# Patient Record
Sex: FEMALE | Race: WHITE | Employment: FULL TIME | URBAN - METROPOLITAN AREA
[De-identification: names, ages, dates, MRNs, and addresses within clinical notes are randomized per-mention and may not be internally consistent; named-entity substitution may affect disease eponyms.]

---

## 2017-12-09 ENCOUNTER — HOSPITAL ENCOUNTER (OUTPATIENT)
Dept: OTHER | Age: 32
Discharge: HOME | End: 2017-12-09
Attending: INTERNAL MEDICINE | Admitting: INTERNAL MEDICINE

## 2017-12-09 DIAGNOSIS — S93.402A SPRAIN OF LEFT ANKLE, UNSPECIFIED LIGAMENT, INITIAL ENCOUNTER: ICD-10-CM

## 2018-06-18 ENCOUNTER — HOSPITAL ENCOUNTER (OUTPATIENT)
Dept: PSYCHIATRY | Age: 33
Discharge: HOME | End: 2018-06-30
Attending: PSYCHIATRY & NEUROLOGY | Admitting: PSYCHIATRY & NEUROLOGY

## 2018-06-18 ASSESSMENT — PATIENT HEALTH QUESTIONNAIRE - PHQ9: SUM OF ALL RESPONSES TO PHQ QUESTIONS 1-9: 22

## 2018-06-18 ASSESSMENT — SLEEP AND FATIGUE QUESTIONNAIRES
DO YOU HAVE DIFFICULTY SLEEPING: YES
DIFFICULTY FALLING ASLEEP: NO
AVERAGE NUMBER OF SLEEP HOURS: 10
RESTFUL SLEEP: NO
SLEEP PATTERN: DIFFICULTY ARISING
DIFFICULTY ARISING: YES
DO YOU USE A SLEEP AID: NO
DIFFICULTY STAYING ASLEEP: NO

## 2018-06-18 ASSESSMENT — LIFESTYLE VARIABLES: HISTORY_ALCOHOL_USE: NO
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
## 2018-06-19 ENCOUNTER — HOSPITAL ENCOUNTER (OUTPATIENT)
Dept: PSYCHIATRY | Age: 33
Discharge: HOME | End: 2018-06-20
Admitting: PSYCHIATRY & NEUROLOGY

## 2018-06-21 ENCOUNTER — HOSPITAL ENCOUNTER (OUTPATIENT)
Dept: PSYCHIATRY | Age: 33
Discharge: HOME | End: 2018-06-22
Admitting: PSYCHIATRY & NEUROLOGY

## 2018-06-21 DIAGNOSIS — F33.1 MODERATE EPISODE OF RECURRENT MAJOR DEPRESSIVE DISORDER (HCC): ICD-10-CM

## 2018-06-21 PROCEDURE — 90791 PSYCH DIAGNOSTIC EVALUATION: CPT | Performed by: PSYCHIATRY & NEUROLOGY

## 2018-06-22 ENCOUNTER — HOSPITAL ENCOUNTER (OUTPATIENT)
Dept: PSYCHIATRY | Age: 33
Discharge: HOME | End: 2018-06-23
Admitting: PSYCHIATRY & NEUROLOGY

## 2018-06-26 ENCOUNTER — HOSPITAL ENCOUNTER (OUTPATIENT)
Dept: PSYCHIATRY | Age: 33
Discharge: HOME | End: 2018-06-27
Admitting: PSYCHIATRY & NEUROLOGY

## 2018-06-28 ENCOUNTER — HOSPITAL ENCOUNTER (OUTPATIENT)
Dept: PSYCHIATRY | Age: 33
Discharge: HOME | End: 2018-06-29
Admitting: PSYCHIATRY & NEUROLOGY

## 2018-06-29 ENCOUNTER — HOSPITAL ENCOUNTER (OUTPATIENT)
Dept: PSYCHIATRY | Age: 33
Discharge: HOME | End: 2018-06-30
Admitting: PSYCHIATRY & NEUROLOGY

## 2018-07-01 ENCOUNTER — HOSPITAL ENCOUNTER (OUTPATIENT)
Dept: OTHER | Age: 33
Discharge: HOME | End: 2018-07-01
Attending: PSYCHIATRY & NEUROLOGY | Admitting: PSYCHIATRY & NEUROLOGY

## 2018-07-03 ENCOUNTER — HOSPITAL ENCOUNTER (OUTPATIENT)
Dept: PSYCHIATRY | Age: 33
Discharge: HOME | End: 2018-07-04
Admitting: PSYCHIATRY & NEUROLOGY

## 2018-07-03 NOTE — PLAN OF CARE
Problem: Altered Mood, Depressive Behavior:  Goal: Able to verbalize acceptance of life and situations over which he or she has no control  Able to verbalize acceptance of life and situations over which he or she has no control                                                                       Group Therapy Note    Date: 7/3/2018  Start Time: 8:30  End Time:  9:45  Number of Participants: 9    Type of Group: Psychotherapy    Patient's Goal:  Pt will improve interpersonal interactions through group processing and agenda settting. Notes:  Pt participated in group discussion, provided supportive feedback toward others, and addressed her agenda within the group. Status After Intervention:  Unchanged    Participation Level:  Active Listener and Interactive    Participation Quality: Appropriate, Attentive and Sharing      Speech:  normal      Thought Process/Content: Logical      Affective Functioning: Incongruent      Mood: euthymic      Level of consciousness:  Alert and Attentive      Response to Learning: Able to verbalize current knowledge/experience and Progressing to goal      Endings: None Reported    Modes of Intervention: Education, Support, Socialization, Exploration and Clarifying      Discipline Responsible: /Counselor      Signature:  Haley Mccann

## 2018-07-03 NOTE — PLAN OF CARE
Problem: Altered Mood, Depressive Behavior:  Goal: Able to verbalize and/or display a decrease in depressive symptoms  Able to verbalize and/or display a decrease in depressive symptoms   Outcome: Ongoing                                                                      Group Therapy Note    Date: 7/3/2018  Start Time: 11:15am  End Time:  12:05pm  Number of Participants: 9    Type of Group: Relapse Prevention    Patient's Goal:  To identify negative thinking patterns and early warning signs that may lead to or trigger relapse. Notes:  Pt participated in group discussion and exploration. Pt was open to support and feedback from group members. Pt agreed with other group members that she experiences passive SI at times when she is overwhelmed. Pt stated that she is now more active in relapse prevention planning to provide herself with distractions and supports to assist her in managing her mood and providing care should her thoughts begin to move toward intent to harm herself. Status After Intervention:  Improved    Participation Level:  Active Listener and Interactive    Participation Quality: Appropriate, Attentive, Sharing and Supportive      Speech:  normal      Thought Process/Content: Logical      Affective Functioning: Congruent      Mood: depressed      Level of consciousness:  Alert, Oriented x4 and Attentive      Response to Learning: Able to verbalize/acknowledge new learning, Able to retain information and Capable of insight      Endings: None Reported    Modes of Intervention: Education, Support, Socialization and Exploration      Discipline Responsible: /Counselor      Signature:  RAKAN Singh

## 2018-07-05 ENCOUNTER — HOSPITAL ENCOUNTER (OUTPATIENT)
Dept: PSYCHIATRY | Age: 33
Discharge: HOME | End: 2018-07-06
Admitting: PSYCHIATRY & NEUROLOGY

## 2018-07-05 NOTE — PLAN OF CARE
Problem: Altered Mood, Depressive Behavior:  Goal: Able to verbalize and/or display a decrease in depressive symptoms  Able to verbalize and/or display a decrease in depressive symptoms   Outcome: Ongoing                                                                      Group Therapy Note    Date: 7/5/2018  Start Time: 10:00am  End Time: 11:00am  Number of Participants: 6    Type of Group: Psychoeducation    Psychoeducation/ Recreation Therapy     Patient's Goal: To discuss stress and positive coping skills. Notes: Pt engaged in group discussion. With fellow group members, pt collaboratively came up with healthy activities to engage in as positive coping skills.      Status After Intervention:  Improved    Participation Level: Minimal    Participation Quality: Appropriate and Attentive      Speech:  normal      Thought Process/Content: Logical      Affective Functioning: Congruent      Mood: depressed      Level of consciousness:  Alert and Oriented x4      Response to Learning: Able to retain information and Capable of insight      Endings: None Reported    Modes of Intervention: Education, Support, Socialization and Activity      Discipline Responsible: Psychoeducational Specialist      Signature:  Jason Sethi patient

## 2018-07-06 ENCOUNTER — HOSPITAL ENCOUNTER (OUTPATIENT)
Dept: PSYCHIATRY | Age: 33
Discharge: HOME | End: 2018-07-07
Admitting: PSYCHIATRY & NEUROLOGY

## 2018-07-06 NOTE — PLAN OF CARE
Problem: Altered Mood, Depressive Behavior:  Goal: Able to verbalize and/or display a decrease in depressive symptoms  Able to verbalize and/or display a decrease in depressive symptoms   Outcome: Ongoing                                                                      Group Therapy Note    Date: 7/6/2018  Start Time: 11:15am  End Time:  12:05pm  Number of Participants: 7    Type of Group: Relapse Prevention    Patient's Goal:  To identify triggers for relapse and to develop cognitive restructuring skills to address stressors and triggers. Notes:  Pt participated in group discussion and exploration. Pt was open to feedback and provided support for other group members. Pt made progress toward stated goal.    Status After Intervention:  Improved    Participation Level:  Active Listener and Interactive    Participation Quality: Appropriate, Attentive, Sharing and Supportive      Speech:  normal      Thought Process/Content: Logical      Affective Functioning: Congruent      Mood: depressed      Level of consciousness:  Alert, Oriented x4 and Attentive      Response to Learning: Able to verbalize/acknowledge new learning, Able to retain information and Capable of insight      Endings: None Reported    Modes of Intervention: Education, Support, Socialization and Exploration      Discipline Responsible: /Counselor      Signature:  RAKAN Landa

## 2018-07-10 ENCOUNTER — HOSPITAL ENCOUNTER (OUTPATIENT)
Dept: PSYCHIATRY | Age: 33
Discharge: HOME | End: 2018-07-11
Admitting: PSYCHIATRY & NEUROLOGY

## 2018-07-10 NOTE — PLAN OF CARE
Problem: Altered Mood, Depressive Behavior:  Goal: Able to verbalize acceptance of life and situations over which he or she has no control  Able to verbalize acceptance of life and situations over which he or she has no control   Outcome: Ongoing                                                                      Group Therapy Note  Date: 7/10/2018  Start Time: 8:30 am   End Time:  9:45 am   Number of Participants: 7    Type of Group: Psychotherapy    Patient's Goal:  Pt's goal was to share how she feels and share her opinion. Notes:  Pt was engaged in group. Pt met her goal.     Status After Intervention:  Improved    Participation Level:  Active Listener and Interactive    Participation Quality: Appropriate, Attentive and Sharing      Speech:  normal      Thought Process/Content: Logical  Linear      Affective Functioning: Congruent      Mood: euthymic      Level of consciousness:  Alert, Oriented x4 and Attentive      Response to Learning: Able to verbalize current knowledge/experience, Able to verbalize/acknowledge new learning, Able to retain information, Capable of insight, Able to change behavior and Progressing to goal      Endings: None Reported    Modes of Intervention: Education, Support, Socialization, Exploration, Clarifying, Problem-solving, Activity and Movement      Discipline Responsible: /Counselor      Signature:  Jailene De La Fuente, [facility]

## 2018-07-10 NOTE — BH NOTE
Therapist me with Pt to discuss her aftercare. Pt said she is going to return to her therapist, Reyna Katz and her psychiatrist Dr. Carolin Zepeda.

## 2018-07-10 NOTE — PLAN OF CARE
Problem: Altered Mood, Depressive Behavior:  Goal: Able to verbalize acceptance of life and situations over which he or she has no control  Able to verbalize acceptance of life and situations over which he or she has no control   Outcome: Ongoing                                                                      Group Therapy Note    Date: 7/10/2018  Start Time: 10:00am  End Time:  11:00am  Number of Participants: 8     Type of Group: Cognitive Skills     Patient's Goal:  Pts were directed to engage in a group directive focusing on dealing with shame, vulnerability and self-expression. Pts were encouraged to complete the sentences \"I would not like to be perceived as. Delio Pottier Delio Pottier \", \"I would like to be perceived as. Delio Pottier Delio Pottier \", \"I feel like a failure when. Delio Pottier Delio Pottier \" and \"1 of my strengths includes. Delio Pottier Delio Pottier \" Pts were then encouraged to engage in group discussion regarding these topics.      Notes:  Marcy displayed positive ability to engage in directive. She was able to identify answers to each of the questions and displayed positive effort to be an active member of the group. Status After Intervention:  Improved    Participation Level:  Active Listener and Interactive    Participation Quality: Appropriate, Attentive, Sharing and Supportive      Speech:  normal      Thought Process/Content: Logical  Linear      Affective Functioning: Congruent      Mood: euthymic      Level of consciousness:  Alert, Oriented x4 and Attentive      Response to Learning: Able to verbalize current knowledge/experience, Able to verbalize/acknowledge new learning, Able to retain information and Progressing to goal      Endings: None Reported    Modes of Intervention: Education, Support, Exploration, Clarifying, Problem-solving, Activity and Limit-setting      Discipline Responsible: Psychoeducational Specialist      Signature:  Jayashree Duganr, MS, ATR-BC

## 2018-07-12 ENCOUNTER — HOSPITAL ENCOUNTER (OUTPATIENT)
Dept: PSYCHIATRY | Age: 33
Discharge: HOME | End: 2018-07-13
Admitting: PSYCHIATRY & NEUROLOGY

## 2018-07-12 NOTE — PLAN OF CARE
Problem: Altered Mood, Depressive Behavior:  Goal: Able to verbalize acceptance of life and situations over which he or she has no control  Able to verbalize acceptance of life and situations over which he or she has no control   Outcome: Ongoing                                                                      Group Therapy Note    Date: 7/12/2018  Start Time: 10:00am  End Time:  11:00am  Number of Participants: 6    Type of Group: Cognitive Skills/Art Therapy     Patient's Goal:  Pts were directed to identify 5 values that are of importance to them. Pts were then directed to create small drawings of of how they honor their values. Pts were encouraged to share their values with the group. Notes:  Marcy displayed positive ability to engage in the directive and share with the group. She identified her values to include, family, the healthy development of her kids, etc.     Status After Intervention:  Improved    Participation Level:  Active Listener and Interactive    Participation Quality: Appropriate, Attentive, Sharing and Supportive      Speech:  normal      Thought Process/Content: Logical  Linear      Affective Functioning: artificially bright      Mood: euthymic      Level of consciousness:  Alert, Oriented x4 and Attentive      Response to Learning: Able to verbalize current knowledge/experience, Able to verbalize/acknowledge new learning, Able to retain information and Progressing to goal      Endings: None Reported    Modes of Intervention: Education, Support, Exploration, Clarifying, Problem-solving, Activity and Limit-setting      Discipline Responsible: Psychoeducational Specialist      Signature:  Laura Singletary MS, ATR-BC

## 2018-07-13 ENCOUNTER — HOSPITAL ENCOUNTER (OUTPATIENT)
Dept: PSYCHIATRY | Age: 33
Discharge: HOME | End: 2018-07-14
Admitting: PSYCHIATRY & NEUROLOGY

## 2018-07-13 NOTE — PLAN OF CARE
Problem: Altered Mood, Depressive Behavior:  Goal: Able to verbalize and/or display a decrease in depressive symptoms  Able to verbalize and/or display a decrease in depressive symptoms   Outcome: Ongoing                                                                      Group Therapy Note    Date: 2018  Start Time: 10:00am  End Time:  11:00am  Number of Participants: 8    Type of Group: Relapse Prevention    Patient's Goal:  To identify upcoming events or situations that may trigger a relapse of MH symptoms and/or substance abuse. Notes:  Pt participated in group discussion and exploration. Pt was open to group feedback and offered feedback and support to others. 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 Pt expressed that these events bring her torrie and reduce her depressive feelings. Pt stated that she is working to plan her downtime to have more activities like this. Status After Intervention:  Improved    Participation Level:  Active Listener and Interactive    Participation Quality: Appropriate, Attentive, Sharing and Supportive      Speech:  normal      Thought Process/Content: Logical      Affective Functioning: Congruent      Mood: depressed      Level of consciousness:  Alert, Oriented x4 and Attentive      Response to Learning: Able to verbalize/acknowledge new learning, Able to retain information and Capable of insight      Endings: None Reported    Modes of Intervention: Education, Support, Socialization and Exploration      Discipline Responsible: /Counselor      Signature:  RAKAN Vann

## 2018-07-17 ENCOUNTER — HOSPITAL ENCOUNTER (OUTPATIENT)
Dept: PSYCHIATRY | Age: 33
Setting detail: THERAPIES SERIES
Discharge: HOME | End: 2018-07-17
Payer: COMMERCIAL

## 2018-07-17 PROCEDURE — 90853 GROUP PSYCHOTHERAPY: CPT | Performed by: COUNSELOR

## 2018-07-17 PROCEDURE — 90853 GROUP PSYCHOTHERAPY: CPT

## 2018-07-17 NOTE — PLAN OF CARE
Problem: Altered Mood, Depressive Behavior:  Goal: Able to verbalize acceptance of life and situations over which he or she has no control  Able to verbalize acceptance of life and situations over which he or she has no control   Outcome: Met This Shift                                                                      Group Therapy Note    Date: 7/17/2018  Start Time: 8:30 am   End Time:  9:45 am   Number of Participants: 9    Type of Group: Psychotherapy    Patient's Goal:  Pt's goal was to process her last day. Notes:  Pt was engaged in group. Pt shared that her  decided that he no longer loves her and does not want to be . Pt was tearful. Pt shared that she has been expressing her self to her . Pt met her goal.      Status After Intervention:  Improved    Participation Level:  Active Listener and Interactive    Participation Quality: Appropriate, Attentive, Sharing and Supportive      Speech:  normal      Thought Process/Content: Logical  Linear      Affective Functioning: Congruent      Mood: anxious and depressed      Level of consciousness:  Alert, Oriented x4 and Attentive      Response to Learning: Able to verbalize current knowledge/experience, Able to verbalize/acknowledge new learning, Able to retain information, Capable of insight, Able to change behavior and Progressing to goal      Endings: None Reported    Modes of Intervention: Education, Support, Socialization, Exploration, Clarifying, Problem-solving, Activity and Movement      Discipline Responsible: /Counselor      Signature:  Jailene De La Fuente, [facility]

## 2018-07-17 NOTE — PLAN OF CARE
Problem: Altered Mood, Depressive Behavior:  Goal: Able to verbalize and/or display a decrease in depressive symptoms  Able to verbalize and/or display a decrease in depressive symptoms   Outcome: Ongoing                                                                      Group Therapy Note    Date: 7/17/2018  Start Time: 10:00am  End Time:  11:00am  Number of Participants: 10     Type of Group: Cognitive Skills     Patient's Goal:  Pts were directed to engage in a drawing directive focusing on the G.L.A.D. Technique: identifying something you are grateful for, something you learned within the last week, something you accomplished within the last week and something that brings you torrie in life. Pts were encouraged to share their drawing with fellow group members.     Notes:  Rowan Hylton displayed positive ability to complete the directive and share her completed drawing with the group. Status After Intervention:  Improved    Participation Level:  Active Listener and Interactive    Participation Quality: Appropriate, Attentive, Sharing and Supportive      Speech:  normal      Thought Process/Content: Logical  Linear      Affective Functioning: Congruent      Mood: euthymic      Level of consciousness:  Alert, Oriented x4 and Attentive      Response to Learning: Able to verbalize current knowledge/experience, Able to verbalize/acknowledge new learning, Able to retain information, Capable of insight and Progressing to goal      Endings: None Reported    Modes of Intervention: Education, Support, Exploration, Clarifying, Problem-solving, Activity and Limit-setting      Discipline Responsible: Psychoeducational Specialist      Signature:  Kaylan Torres MS, ATR-BC

## 2018-07-18 ENCOUNTER — HOSPITAL ENCOUNTER (OUTPATIENT)
Dept: PSYCHIATRY | Age: 33
Setting detail: THERAPIES SERIES
Discharge: HOME | End: 2018-07-18
Payer: COMMERCIAL

## 2018-07-18 DIAGNOSIS — F41.1 GAD (GENERALIZED ANXIETY DISORDER): ICD-10-CM

## 2018-07-18 DIAGNOSIS — F43.10 PTSD (POST-TRAUMATIC STRESS DISORDER): ICD-10-CM

## 2018-07-18 PROCEDURE — 99217 PR OBSERVATION CARE DISCHARGE MANAGEMENT: CPT | Performed by: PSYCHIATRY & NEUROLOGY

## 2018-07-18 PROCEDURE — 90853 GROUP PSYCHOTHERAPY: CPT | Performed by: COUNSELOR

## 2018-07-18 PROCEDURE — 90853 GROUP PSYCHOTHERAPY: CPT

## 2018-07-18 NOTE — PROGRESS NOTES
Discharge Summary   Admit Date: 6/20/2018  Discharge Date:    07/20/2018  Spent over 30 minutes with patient and staff on [facility]   Final Dx: axis I:  Major depressive episode, recurrent, nonpsychotic, severe. 2.  Generalized anxiety disorder. 3.  PTSD. Axis 2: No diagnosis  [place] 3: See Medical History    Axis 4: related to the social environment  Axis 5:  On Admission: 41-50 serious symptoms At Discharge: 61-70 mild symptoms   All conditions on Axis 1 and Axis 2 and active problems on Axis 3 were treated while patient was hospitalized. (There are no active hospital problems to display for this patient.  )   Condition on DC  Mood and affect are stable and pt is not suicidal   VITALS:  There were no vitals taken for this visit. Brief Summary Present Illness   The patient is a 70-year-old white female who has been  diagnosed with major depression and generalized anxiety. She states she  has been feeling depressed since childhood, but has been worsening over the  past 6-7 years. She has been treating with Dr. Augie Escalera since  01/2018 and has been seeing an individual therapist and marriage counselor  through [facility] and [facility]. She stated that her mood has  been labile and that she has been sleeping okay and eating relatively well. She complains of nightmares, although she is not waking up with a  nightmares, but her  complains that she is fighting in her dreams at  night. She stated that her anxiety and mood have been disrupted and she  will go into rages at times. She states that she will become physical and  push her  away and she is trying to find a way to cope. She does  not want her three children to see her behaving in this manner.   was involved in the intake process and apparently would answer questions  for patient. They have been together since she was 12years of age. She  is on leave from work currently. They are marriage counseling as well. Apparently, the patient and her  has been in an open marriage since  2011. Apparently, the patient said that the  dates other people,  but she does not because she does not believe in it. The patient's   just came out that he is polyamorous. The patient attempted suicide at the  beginning of 03/2018 after  broke up a relationship with a woman. Apparently, the patient worked with someone who was causing problems in the  marriage as well. She denies active suicidal ideation  Hospital Course  Patient stabilized on meds and milieu treatment. Patient was discharged to home to continue recovery in the community. PE: (reviewed) and labs (see medical H&PE)  Labs:    No visits with results within 2 Day(s) from this visit.    Latest known visit with results is:   Admission on 04/15/2018, Discharged on 04/16/2018   Component Date Value Ref Range Status    WBC 04/15/2018 12.7* 4.0 - 11.0 K/uL Final    RBC 04/15/2018 4.34  4.00 - 5.20 M/uL Final    Hemoglobin 04/15/2018 13.9  12.0 - 16.0 g/dL Final    Hematocrit 04/15/2018 40.1  36.0 - 48.0 % Final    MCV 04/15/2018 92.5  80.0 - 100.0 fL Final    MCH 04/15/2018 32.0  26.0 - 34.0 pg Final    MCHC 04/15/2018 34.5  31.0 - 36.0 g/dL Final    RDW 04/15/2018 12.4  12.4 - 15.4 % Final    Platelets 68/81/5241 264  135 - 450 K/uL Final    MPV 04/15/2018 7.8  5.0 - 10.5 fL Final    Neutrophils % 04/15/2018 64.8  % Final    Lymphocytes % 04/15/2018 19.4  % Final    Monocytes % 04/15/2018 7.0  % Final    Eosinophils % 04/15/2018 8.4  % Final    Basophils % 04/15/2018 0.4  % Final    Neutrophils # 04/15/2018 8.2* 1.7 - 7.7 K/uL Final    Lymphocytes # 04/15/2018 2.5  1.0 - 5.1 K/uL Final    Monocytes # 04/15/2018 0.9  0.0 - 1.3 K/uL Final    Eosinophils # 04/15/2018 1.1* 0.0 - 0.6 K/uL Final    Basophils # 04/15/2018 0.0  0.0 - 0.2 K/uL Final    Sodium 04/15/2018 140  136 - 145 mmol/L Final    Potassium 04/15/2018 3.8  3.5 - 5.1 mmol/L Final    Chloride 04/15/2018 105  99 - 110 mmol/L Final    CO2 04/15/2018 22  21 - 32 mmol/L Final    Anion Gap 04/15/2018 13  3 - 16 Final    Glucose 04/15/2018 98  70 - 99 mg/dL Final    BUN 04/15/2018 15  7 - 20 mg/dL Final    CREATININE 04/15/2018 <0.5* 0.6 - 1.1 mg/dL Final    GFR Non- 04/15/2018 >60  >60 Final    Comment: >60 mL/min/1.73m2 EGFR, calc. for ages 25 and older using the  MDRD formula (not corrected for weight), is valid for stable  renal function.  GFR  04/15/2018 >60  >60 Final    Comment: Chronic Kidney Disease: less than 60 ml/min/1.73 sq.m. Kidney Failure: less than 15 ml/min/1.73 sq.m. Results valid for patients 18 years and older.  Calcium 04/15/2018 9.8  8.3 - 10.6 mg/dL Final    Total Protein 04/15/2018 6.9  6.4 - 8.2 g/dL Final    Alb 04/15/2018 4.1  3.4 - 5.0 g/dL Final    Albumin/Globulin Ratio 04/15/2018 1.5  1.1 - 2.2 Final    Total Bilirubin 04/15/2018 <0.2  0.0 - 1.0 mg/dL Final    Alkaline Phosphatase 04/15/2018 71  40 - 129 U/L Final    ALT 04/15/2018 12  10 - 40 U/L Final    AST 04/15/2018 12* 15 - 37 U/L Final    Globulin 04/15/2018 2.8  g/dL Final    Ethanol Lvl 04/15/2018 None Detected  mg/dL Final    Comment:    None Detected  Conversion factor:  100 mg/dl = .100 g/dl  For Medical Purposes Only      HCG(Urine) Pregnancy Test 04/15/2018 Negative  Detects HCG level >20 MIU/mL Final    Comment: Note:  False Negative pregnancy results have been reported in  early pregnancy due to insufficient amounts of hCG and in late  first trimester pregnancies, though very rare, due to the hook  effect.   Always repeat results in question with a serum  quantitative pregnancy test. A serum hCG is positive 2-5 days  before the urine pregnancy test.      Color, UA 04/15/2018 Yellow  Straw/Yellow Final    Clarity, UA 04/15/2018 Clear  Clear Final    Glucose, Ur 04/15/2018 Negative  Negative mg/dL Final    Bilirubin Urine 04/15/2018 Negative  Negative Final    Ketones, Urine 04/15/2018 Negative  Negative mg/dL Final    Specific Gravity, UA 04/15/2018 >=1.030  1.005 - 1.030 Final    Blood, Urine 04/15/2018 Negative  Negative Final    pH, UA 04/15/2018 5.5  5.0 - 8.0 Final    Protein, UA 04/15/2018 Negative  Negative mg/dL Final    Urobilinogen, Urine 04/15/2018 0.2  <2.0 E.U./dL Final    Nitrite, Urine 04/15/2018 Negative  Negative Final    Leukocyte Esterase, Urine 04/15/2018 Negative  Negative Final    Microscopic Examination 04/15/2018 Not Indicated   Final    Urine Type 04/15/2018 Not Specified   Final    Amphetamine Screen, Urine 04/15/2018 Neg  Negative <1000ng/mL Final    Barbiturate Screen, Ur 04/15/2018 Neg  Negative <200 ng/mL Final    Benzodiazepine Screen, Urine 04/15/2018 Neg  Negative <200 ng/mL Final    Cannabinoid Scrn, Ur 04/15/2018 Neg  Negative <50 ng/mL Final    Cocaine Metabolite Screen, Urine 04/15/2018 Neg  Negative <300 ng/mL Final    Opiate Scrn, Ur 04/15/2018 Neg  Negative <300 ng/mL Final    Comment: \"Therapeutic levels of pain medication, especially oxycontin and synthetic  opioids, may not be detected by this Methodology. Pain management screen  panel  Drug panel-PM-Hi Res Ur, Interp (PAIN) should be considered for drug  monitoring \".  PCP Screen, Urine 04/15/2018 Neg  Negative <25 ng/mL Final    Methadone Screen, Urine 04/15/2018 Neg  Negative <300 ng/mL Final    Propoxyphene Scrn, Ur 04/15/2018 Neg  Negative <300 ng/mL Final    pH, UA 04/15/2018 5.5   Final    Comment: Urine pH less than 5.0 or greater than 8.0 may indicate sample adulteration. Another sample should be collected if clinically  indicated.  Drug Screen Comment: 04/15/2018 see below   Final    Comment: This method is a screening test to detect only these drug  classes as part of a medical workup. Confirmatory testing  by another method should be ordered if clinically indicated.       Oxycodone Urine

## 2018-07-20 ENCOUNTER — HOSPITAL ENCOUNTER (OUTPATIENT)
Dept: PSYCHIATRY | Age: 33
Setting detail: THERAPIES SERIES
Discharge: HOME | End: 2018-07-20
Payer: COMMERCIAL

## 2018-07-20 PROCEDURE — 90853 GROUP PSYCHOTHERAPY: CPT | Performed by: COUNSELOR

## 2018-07-20 PROCEDURE — 90853 GROUP PSYCHOTHERAPY: CPT

## 2018-07-20 NOTE — PLAN OF CARE
Problem: Altered Mood, Depressive Behavior:  Goal: Able to verbalize and/or display a decrease in depressive symptoms  Able to verbalize and/or display a decrease in depressive symptoms   Outcome: Ongoing                                                                      Group Therapy Note    Date: 7/20/2018  Start Time: 10:00am  End Time: 11:00am  Number of Participants: 6    Type of Group: Psychoeducation    Psycheducation/ Recreation Therapy    Patient's Goal: To discuss ways to increase and cultivate positivity in ones life. To create a positivity frame with art materials. Notes: Pt engaged in group activity. Pt completed the frame with a quote as a reminder to embrace positivity and use as a coping skill. Status After Intervention:  Improved    Participation Level:  Active Listener and Interactive    Participation Quality: Appropriate, Attentive and Sharing      Speech:  normal      Thought Process/Content: Logical      Affective Functioning: Congruent      Mood: euthymic      Level of consciousness:  Alert and Oriented x4      Response to Learning: Able to retain information and Capable of insight      Endings: None Reported    Modes of Intervention: Education, Support, Socialization and Activity      Discipline Responsible: Psychoeducational Specialist      Signature:  Misty Trent

## 2021-10-12 ENCOUNTER — HOSPITAL ENCOUNTER (EMERGENCY)
Age: 36
Discharge: HOME | End: 2021-10-12
Attending: STUDENT IN AN ORGANIZED HEALTH CARE EDUCATION/TRAINING PROGRAM

## 2021-10-12 VITALS
WEIGHT: 165 LBS | RESPIRATION RATE: 16 BRPM | DIASTOLIC BLOOD PRESSURE: 90 MMHG | HEIGHT: 64 IN | SYSTOLIC BLOOD PRESSURE: 140 MMHG | BODY MASS INDEX: 28.17 KG/M2 | TEMPERATURE: 97.9 F | OXYGEN SATURATION: 98 % | HEART RATE: 104 BPM

## 2021-10-12 DIAGNOSIS — Z00.8 ENCOUNTER FOR PSYCHOLOGICAL EVALUATION: ICD-10-CM

## 2021-10-12 DIAGNOSIS — F39 MOOD DISORDER (HCC): Primary | ICD-10-CM

## 2021-10-12 LAB
A/G RATIO: 1.5 (ref 1.1–2.2)
ACETAMINOPHEN LEVEL: <5 UG/ML (ref 10–30)
ALBUMIN SERPL-MCNC: 4.3 G/DL (ref 3.4–5)
ALP BLD-CCNC: 73 U/L (ref 40–129)
ALT SERPL-CCNC: 13 U/L (ref 10–40)
AMPHETAMINE SCREEN, URINE: ABNORMAL
ANION GAP SERPL CALCULATED.3IONS-SCNC: 13 MMOL/L (ref 3–16)
AST SERPL-CCNC: 13 U/L (ref 15–37)
BARBITURATE SCREEN URINE: ABNORMAL
BASOPHILS ABSOLUTE: 0 K/UL (ref 0–0.2)
BASOPHILS RELATIVE PERCENT: 0.3 %
BENZODIAZEPINE SCREEN, URINE: ABNORMAL
BILIRUB SERPL-MCNC: <0.2 MG/DL (ref 0–1)
BUN BLDV-MCNC: 10 MG/DL (ref 7–20)
CALCIUM SERPL-MCNC: 8.8 MG/DL (ref 8.3–10.6)
CANNABINOID SCREEN URINE: POSITIVE
CHLORIDE BLD-SCNC: 106 MMOL/L (ref 99–110)
CO2: 18 MMOL/L (ref 21–32)
COCAINE METABOLITE SCREEN URINE: ABNORMAL
CREAT SERPL-MCNC: 0.6 MG/DL (ref 0.6–1.1)
EOSINOPHILS ABSOLUTE: 0.4 K/UL (ref 0–0.6)
EOSINOPHILS RELATIVE PERCENT: 2.6 %
ETHANOL: NORMAL MG/DL (ref 0–0.08)
GFR AFRICAN AMERICAN: >60
GFR NON-AFRICAN AMERICAN: >60
GLOBULIN: 2.9 G/DL
GLUCOSE BLD-MCNC: 108 MG/DL (ref 70–99)
HCG QUALITATIVE: NEGATIVE
HCT VFR BLD CALC: 39 % (ref 36–48)
HEMOGLOBIN: 13.5 G/DL (ref 12–16)
INFLUENZA A: NOT DETECTED
INFLUENZA B: NOT DETECTED
LYMPHOCYTES ABSOLUTE: 3.4 K/UL (ref 1–5.1)
LYMPHOCYTES RELATIVE PERCENT: 22.2 %
Lab: ABNORMAL
MCH RBC QN AUTO: 32.8 PG (ref 26–34)
MCHC RBC AUTO-ENTMCNC: 34.6 G/DL (ref 31–36)
MCV RBC AUTO: 94.9 FL (ref 80–100)
METHADONE SCREEN, URINE: ABNORMAL
MONOCYTES ABSOLUTE: 0.8 K/UL (ref 0–1.3)
MONOCYTES RELATIVE PERCENT: 5.1 %
NEUTROPHILS ABSOLUTE: 10.6 K/UL (ref 1.7–7.7)
NEUTROPHILS RELATIVE PERCENT: 69.8 %
OPIATE SCREEN URINE: ABNORMAL
OXYCODONE URINE: ABNORMAL
PDW BLD-RTO: 13.5 % (ref 12.4–15.4)
PH UA: 7
PHENCYCLIDINE SCREEN URINE: ABNORMAL
PLATELET # BLD: 279 K/UL (ref 135–450)
PMV BLD AUTO: 8.6 FL (ref 5–10.5)
POTASSIUM REFLEX MAGNESIUM: 3.7 MMOL/L (ref 3.5–5.1)
PROPOXYPHENE SCREEN: ABNORMAL
RBC # BLD: 4.11 M/UL (ref 4–5.2)
SALICYLATE, SERUM: <0.3 MG/DL (ref 15–30)
SARS-COV-2 RNA, RT PCR: NOT DETECTED
SODIUM BLD-SCNC: 137 MMOL/L (ref 136–145)
TOTAL PROTEIN: 7.2 G/DL (ref 6.4–8.2)
WBC # BLD: 15.2 K/UL (ref 4–11)

## 2021-10-12 PROCEDURE — 80143 DRUG ASSAY ACETAMINOPHEN: CPT

## 2021-10-12 PROCEDURE — 80307 DRUG TEST PRSMV CHEM ANLYZR: CPT

## 2021-10-12 PROCEDURE — 84703 CHORIONIC GONADOTROPIN ASSAY: CPT

## 2021-10-12 PROCEDURE — 80053 COMPREHEN METABOLIC PANEL: CPT

## 2021-10-12 PROCEDURE — 87636 SARSCOV2 & INF A&B AMP PRB: CPT

## 2021-10-12 PROCEDURE — 80179 DRUG ASSAY SALICYLATE: CPT

## 2021-10-12 PROCEDURE — 99285 EMERGENCY DEPT VISIT HI MDM: CPT

## 2021-10-12 PROCEDURE — 82077 ASSAY SPEC XCP UR&BREATH IA: CPT

## 2021-10-12 PROCEDURE — 6370000000 HC RX 637 (ALT 250 FOR IP): Performed by: STUDENT IN AN ORGANIZED HEALTH CARE EDUCATION/TRAINING PROGRAM

## 2021-10-12 PROCEDURE — 85025 COMPLETE CBC W/AUTO DIFF WBC: CPT

## 2021-10-12 RX ORDER — LORAZEPAM 1 MG/1
1 TABLET ORAL ONCE
Status: COMPLETED | OUTPATIENT
Start: 2021-10-12 | End: 2021-10-12

## 2021-10-12 RX ADMIN — LORAZEPAM 1 MG: 1 TABLET ORAL at 04:14

## 2021-10-12 ASSESSMENT — ENCOUNTER SYMPTOMS
SORE THROAT: 0
ABDOMINAL PAIN: 0
COUGH: 0
NAUSEA: 0
BACK PAIN: 0
SHORTNESS OF BREATH: 0
RHINORRHEA: 0
VOMITING: 0

## 2021-10-12 NOTE — ED NOTES
Pt is longer tearful, and is resting calmly in bed. Her eyes are closed, and respiration is even, and easy. Will continue to monitor for pt safety.      Santos Meza  10/12/21 3650

## 2021-10-12 NOTE — ED NOTES
Patient given breakfast tray attempting to get patient awake.       Jory Simon, ABRAHAM  10/12/21 9505

## 2021-10-12 NOTE — ED NOTES
Level of Care Disposition: discharge       Patient was seen by ED provider and [facility] staff. The case was presented to psychiatric provider on-call Dr. Dewayne Jacob. Based on the ED evaluation and information presented to the provider by this writer , it is the recommendation of the on call psychiatric provider that the patient be discharged from a psychiatric standpoint with the referrals. Patient has denied SI since arrival in the [facility] with no unsafe behaviors noted. Collateral from patient's  was obtained with  having no safety concerns for patient. Patient is however still drowsy after being given ativan earlier in the shift. Patient is currently sleeping but arouses easily. Safety Plan will be completed when patient awakens.                   Blanca Burgos RN  10/12/21 1467

## 2021-10-12 NOTE — ED NOTES
Attempted to contact pt's , Caterina Esteban, listed as pt's emergency contact.     Listed @ [id number]  10/12/21 5764

## 2021-10-12 NOTE — ED NOTES
Patient has order to discharge home. Patient given discharge instructions. Patient states she understands. Patient left to home.       Chi Shanks RN  10/12/21 [id number]

## 2021-10-12 NOTE — ED NOTES
Patient asleep. Patient drove to ED and will await patient to awaken more to be able to drive home. Will continue to monitor patient.       Everardo Cross RN  10/12/21 9829

## 2021-10-12 NOTE — ED NOTES
Ativan effective. Patient appears to be resting with eyes closed and respirations even and easy. Safety checks continue.       Nanyc Acosta RN  10/12/21 1296

## 2021-10-12 NOTE — ED NOTES
Presenting Problem:Patient presents to the [facility] voluntarily after an argument with her  then feeling like she wanted to harm herself. She denied having any specific plan to harm self and now denies SI or thoughts of harming self. She does report mood lability, poor sleep, crying episodes and \" going through an emotional breakdown\" at least once a month. She feels that it is usually about a week before she starts her menses every month that her moods are out of control. She states that she does have thoughts of wanting to die when she has these emotional breaks but when she calms down she does not feel suicidal. She states that she feels often like no one cares about her or loves her. Appearance/Hygiene:  well-appearing   Motor Behavior: wnl   Attitude: cooperative  Affect: anxiety , labile, depressed  Speech: normal pitch and normal volume  Mood: anxious, depressed, labile and sad   Thought Processes: Goal directed  Perceptions: Absent   Thought content: wnl   Orientation: A&Ox4   Memory: intact  Concentration: Fair    Insight/ judgement: impaired insight      Psychosocial and contextual factors: Patient lives with  and 3 children. She works part time at a bar. She has had an increase in stress due to her autistic child having problems in school and the stress of changing jobs a few months ago. She currently does not have insurance which is what has prevented her from getting a therapist of psychiatrist.     Ion Garay flowsheet is  Complete. Psychiatric History (including current outpatient provider and past inpatient admissions): hx MDD, anxiety and PTSD with no inpatient admissions. She did have outpatient treatment in past with a psychiatrist and therapist but due to no insurance she no longer goes. She last saw a therapist in 3-2021 and has not seen a psychiatrist in 3 or more years. She did complete the PHP program here in 2018.      Access to Firearms: yes but locked up and only her  has the key    ASSESSMENT FOR IMMINENT FUTURE DANGER:    RISK FACTORS:    []  Age <25 or >49   []  Male gender   [x]  Depressed mood   []  Active suicidal ideation   []  Suicide plan   []  Suicide attempt   []  Access to lethal means   [x]  Prior suicide attempt   [x]  Active substance abuse (if yes pleases add details -thc)   [x]  Highly impulsive behaviors   []  Not attending to self-care/ADLs    []  Recent significant loss   []  Chronic pain or medical illness   []  Social isolation   []  History of violence (if yes please elaborate)   []  Active psychosis   []  Cognitive impairment    [x]  No outpatient services in place   []  Medication noncompliance   []  No collateral information to support safety  [] Self- injurious/ Self-harm behavior    PROTECTIVE FACTORS:  [x] Age >25 and <55  [x] Female gender   [] Denies depression  [x] Denies suicidal ideation  [x] Does not have lethal plan   [x] Does not have access to guns or weapons  [x] Patient is verbally juan jose for safety  [] No prior suicide attempts  [] No active substance abuse  [x] Patient has social or family support  [x] No active psychosis or cognitive dysfunction  [x] Physically healthy  [] Has outpatient services in place  [] Compliant with recommended medications  [x] Collateral information from  supports patient safety       Patient labile and tearful during assessment. She was verbally reassured and given ativan 1 mg oral for anxiety. She will be reassessed when calmer. Safety checks continue.         Lili Tillman RN  10/12/21 7751

## 2021-10-12 NOTE — ED PROVIDER NOTES
10:23 AM   Ham Nolan was signed out to me in stable condition. Please see Dr. Madai rDummond documentation for details of their history, physical, and laboratory studies. Upon re-examination, a summary of Marcy Trujillo's history, physical examination, and studies are as follows:   Patient presented for behavioral evaluation at time of signout she has been medically cleared and awaiting further reevaluation by the behavioral team.  At this time, patient has been evaluated by behavioral team they feel the patient safe for discharge home with close outpatient follow-ups. Patient will be discharged at this time.     Results for orders placed or performed during the hospital encounter of 10/12/21   COVID-19 & Influenza Combo    Specimen: Nasopharyngeal Swab; Nasal   Result Value Ref Range    SARS-CoV-2 RNA, RT PCR NOT DETECTED NOT DETECTED    INFLUENZA A NOT DETECTED NOT DETECTED    INFLUENZA B NOT DETECTED NOT DETECTED   CBC auto differential   Result Value Ref Range    WBC 15.2 (H) 4.0 - 11.0 K/uL    RBC 4.11 4.00 - 5.20 M/uL    Hemoglobin 13.5 12.0 - 16.0 g/dL    Hematocrit 39.0 36.0 - 48.0 %    MCV 94.9 80.0 - 100.0 fL    MCH 32.8 26.0 - 34.0 pg    MCHC 34.6 31.0 - 36.0 g/dL    RDW 13.5 12.4 - 15.4 %    Platelets 713 026 - 811 K/uL    MPV 8.6 5.0 - 10.5 fL    Neutrophils % 69.8 %    Lymphocytes % 22.2 %    Monocytes % 5.1 %    Eosinophils % 2.6 %    Basophils % 0.3 %    Neutrophils Absolute 10.6 (H) 1.7 - 7.7 K/uL    Lymphocytes Absolute 3.4 1.0 - 5.1 K/uL    Monocytes Absolute 0.8 0.0 - 1.3 K/uL    Eosinophils Absolute 0.4 0.0 - 0.6 K/uL    Basophils Absolute 0.0 0.0 - 0.2 K/uL   Comprehensive Metabolic Panel w/ Reflex to MG   Result Value Ref Range    Sodium 137 136 - 145 mmol/L    Potassium reflex Magnesium 3.7 3.5 - 5.1 mmol/L    Chloride 106 99 - 110 mmol/L    CO2 18 (L) 21 - 32 mmol/L    Anion Gap 13 3 - 16    Glucose 108 (H) 70 - 99 mg/dL    BUN 10 7 - 20 mg/dL    CREATININE 0.6 0.6 - 1.1 mg/dL GFR Non-African American >60 >60    GFR African American >60 >60    Calcium 8.8 8.3 - 10.6 mg/dL    Total Protein 7.2 6.4 - 8.2 g/dL    Albumin 4.3 3.4 - 5.0 g/dL    Albumin/Globulin Ratio 1.5 1.1 - 2.2    Total Bilirubin <0.2 0.0 - 1.0 mg/dL    Alkaline Phosphatase 73 40 - 129 U/L    ALT 13 10 - 40 U/L    AST 13 (L) 15 - 37 U/L    Globulin 2.9 Not Established g/dL   Urine Drug Screen   Result Value Ref Range    Amphetamine Screen, Urine Neg Negative <1000ng/mL    Barbiturate Screen, Ur Neg Negative <200 ng/mL    Benzodiazepine Screen, Urine Neg Negative <200 ng/mL    Cannabinoid Scrn, Ur POSITIVE (A) Negative <50 ng/mL    Cocaine Metabolite Screen, Urine Neg Negative <300 ng/mL    Opiate Scrn, Ur Neg Negative <300 ng/mL    PCP Screen, Urine Neg Negative <25 ng/mL    Methadone Screen, Urine Neg Negative <300 ng/mL    Propoxyphene Scrn, Ur Neg Negative <300 ng/mL    Oxycodone Urine Neg Negative <100 ng/ml    pH, UA 7.0     Drug Screen Comment: see below    Acetaminophen level   Result Value Ref Range    Acetaminophen Level <5 (L) 10 - 30 ug/mL   Salicylate   Result Value Ref Range    Salicylate, Serum <7.6 (L) 15.0 - 30.0 mg/dL   hCG, serum, qualitative   Result Value Ref Range    hCG Qual Negative Detects HCG level >10 MIU/mL   Ethanol   Result Value Ref Range    Ethanol Lvl None Detected mg/dL         Final Impression    1. Mood disorder ([facility] [address]    2. Encounter for psychological evaluation        Blood pressure (!) 140/90, pulse 104, temperature 97.9 °F (36.6 °C), temperature source Tympanic, resp. rate 16, height 5' 4\" (1.626 m), weight 165 lb (74.8 kg), last menstrual period 09/20/2021, SpO2 98 %. Final Disposition:  Ruben Taylor was discharged home in stable condition.      Rosetta Perea MD  10/12/21 0750
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
## 2021-10-12 NOTE — ED NOTES
Pt continues to rest quietly. Lying in treatment room. Will continue to monitor for pt safety.      Karely Bradley  10/12/21 1859

## 2021-10-12 NOTE — ED NOTES
Patient is resting peacefully in treatment room. Eyes closed, respiration even, and easy. Will continue to monitor for pt safety.      Therese Andino  10/12/21 7599

## 2021-10-12 NOTE — ED NOTES
To [address]. Quite tearful, although cooperative, and changed into precautionary gown without trouble. Pt willingly allowed to staff to take all necessary belongings, and place them in her locker. Pt was escorted to room B3, and where she is now lying in bed. Pt was unable to provide urine at this time. Will continue to monitor for safety.      Malcom Moran  10/12/21 0220

## 2021-10-12 NOTE — SUICIDE SAFETY PLAN
SAFETY PLAN    A suicide Safety Plan is a document that supports someone when they are having thoughts of suicide. Warning Signs that indicate a suicidal crisis may be developing: What (situations, thoughts, feelings, body sensations, behaviors, etc.) do you experience that lets you know you are beginning to think about suicide? 1. Shift/mood change  2. Negative thoughts    Internal Coping Strategies:  What things can I do (relaxation techniques, hobbies, physical activities, etc.) to take my mind off my problems without contacting another person? 1. Video games  2. Read a book  3. Watch a movie    People and social settings that provide distraction: Who can I call or where can I go to distract me? 1. Name: Eulogio Zuniga   2. Place: [facility] whom I can ask for help: Who can I call when I need help - for example, friends, family, clergy, someone else? 1. Name:                  2. Name: Sister      Professionals or [facility] agencies I can contact during a crisis: Who can I call for help - for example, my doctor, my psychiatrist, my psychologist, a mental health provider, a suicide hotline? 1. Suicide Prevention Lifeline: 9-368-026-TALK (7330)    2. [address] [place] [address] Emergency Services -  for example, [address] suicide hotline, [address] [place] [address]: 21 [phone] (1454)        Making the environment safe: How can I make my environment (house/apartment/living space) safer? For example, can I remove guns, medications, and other items? 1. Long walk until I calm down      Something that is important to me and worth living for is:  1.  My kids
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
